# Patient Record
Sex: MALE | Race: WHITE | Employment: STUDENT | URBAN - METROPOLITAN AREA
[De-identification: names, ages, dates, MRNs, and addresses within clinical notes are randomized per-mention and may not be internally consistent; named-entity substitution may affect disease eponyms.]

---
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
## 2024-01-30 DIAGNOSIS — S92.002B OPEN DISPLACED FRACTURE OF LEFT CALCANEUS, UNSPECIFIED PORTION OF CALCANEUS, INITIAL ENCOUNTER: Primary | ICD-10-CM

## 2024-02-01 ENCOUNTER — OFFICE VISIT (OUTPATIENT)
Dept: ORTHOPEDIC SURGERY | Age: 19
End: 2024-02-01
Payer: COMMERCIAL

## 2024-02-01 ENCOUNTER — HOSPITAL ENCOUNTER (OUTPATIENT)
Dept: GENERAL RADIOLOGY | Age: 19
Discharge: HOME | End: 2024-02-03
Payer: COMMERCIAL

## 2024-02-01 DIAGNOSIS — S92.002B OPEN DISPLACED FRACTURE OF LEFT CALCANEUS, UNSPECIFIED PORTION OF CALCANEUS, INITIAL ENCOUNTER: ICD-10-CM

## 2024-02-01 DIAGNOSIS — S91.302A OPEN LATERAL DISLOCATION OF LEFT SUBTALAR JOINT, INITIAL ENCOUNTER: Primary | ICD-10-CM

## 2024-02-01 DIAGNOSIS — S93.315A OPEN LATERAL DISLOCATION OF LEFT SUBTALAR JOINT, INITIAL ENCOUNTER: Primary | ICD-10-CM

## 2024-02-01 PROCEDURE — 99213 OFFICE O/P EST LOW 20 MIN: CPT | Performed by: ORTHOPAEDIC SURGERY

## 2024-02-01 PROCEDURE — G8484 FLU IMMUNIZE NO ADMIN: HCPCS | Performed by: ORTHOPAEDIC SURGERY

## 2024-02-01 PROCEDURE — 73610 X-RAY EXAM OF ANKLE: CPT

## 2024-02-01 PROCEDURE — G8427 DOCREV CUR MEDS BY ELIG CLIN: HCPCS | Performed by: ORTHOPAEDIC SURGERY

## 2024-02-01 PROCEDURE — 99212 OFFICE O/P EST SF 10 MIN: CPT | Performed by: ORTHOPAEDIC SURGERY

## 2024-02-01 PROCEDURE — G8419 CALC BMI OUT NRM PARAM NOF/U: HCPCS | Performed by: ORTHOPAEDIC SURGERY

## 2024-02-01 PROCEDURE — 1036F TOBACCO NON-USER: CPT | Performed by: ORTHOPAEDIC SURGERY

## 2024-02-01 RX ORDER — OMEPRAZOLE 20 MG/1
20 CAPSULE, DELAYED RELEASE ORAL DAILY
COMMUNITY

## 2024-02-06 NOTE — PROGRESS NOTES
Chief Complaint   Patient presents with    Follow-up     6 month f/u ex fix removal. DOS 6/12/23.  Ambulating well without assist. Denies pain        OP:SURGEON: Dr. Alejandro Hughes,   DATE OF PROCEDURE: 6-12-23  PROCEDURE: REMOVAL OF EXTERNAL FIXATOR FROM LEFT LOWER EXTREMITY     Subjective:  Devaughn Dawson is following up from the above surgery. He is WBAT on left lower extremity. He ambulates with no assistive device.  Pain to extremity is none and is not taking prescribed pain medication. They denies numbness or tingling to the left lower extremity. Denies calf pain, chest pain, or shortness of breath.  Patient has finished DVT prophylaxis. Patient is not participating in therapy at this time.  Presents today with his father.  He is doing well after surgery.  States that he does have a mild limp which is improved and intermittent.  He has been working on home exercise program with [employer] and would like to get set up for additional physical therapy as he felt this did significantly help him prior.     Review of Systems -  All pertinent positives/negative in HPI     Objective:    General: Alert and oriented X 3, normocephalic atraumatic, external ears and eye normal, sclera clear, no acute distress, respirations easy and unlabored with no audible wheezes, skin warm and dry, speech and dress appropriate for noted age, affect euthymic.    Extremity:  Left Lower Extremity  Skin clean dry and intact, without signs of infection   Incision well-healed, no edema noted  Compartments supple throughout thigh and leg  Calf supple and not tender, negative Homans  Demonstrates active motion with ankle DF/PF/EHL  Ankle ROM 30/40 DF/PF, 5/10 subtalar ER/IR  Ambulates with no assistive devices.  States sensation intact to touch in sural, deep peroneal, superficial peroneal, saphenous, posterior tibial  nerve distributions to foot/ankle.  Palpable dorsalis pedis and posterior tibialis pulses, cap refill brisk in toes, foot

## 2024-02-08 ENCOUNTER — EVALUATION (OUTPATIENT)
Dept: PHYSICAL THERAPY | Age: 19
End: 2024-02-08

## 2024-02-08 DIAGNOSIS — S93.315A OPEN LATERAL DISLOCATION OF LEFT SUBTALAR JOINT, INITIAL ENCOUNTER: Primary | ICD-10-CM

## 2024-02-08 DIAGNOSIS — S91.302A OPEN LATERAL DISLOCATION OF LEFT SUBTALAR JOINT, INITIAL ENCOUNTER: Primary | ICD-10-CM

## 2024-02-08 NOTE — PROGRESS NOTES
Physical Therapy Daily Treatment Note    Date: 2024  Patient Name: Devaughn Dawson  : 2005   MRN: [id number]  DOInjury: 2023  DOSx: 2023; ex fix removal 2023  Referring Provider: Alejandro Hughes, DO  [address] [place] [address]     Medical Diagnosis:      Diagnosis Orders   1. Open lateral dislocation of left subtalar joint, initial encounter          Procedure(s):  LEFT FOOT IRRIGAATION AND DEBRIDEMENT WITH EXTERNAL FIXATOR APPLICATION     Surgeon(s):  Carlos A Hanley MD    Devaughn has a tight ankle and L LE weakness. Treatment started today with stretching and emphasis to stretch 4 times per day if he wants his ankle to get any looser (given the length of time since injury + immobilization). We will see him in clinic for single leg loading to build strength and to also drive ankle dorsiflexion.          Access Code: [email or web address]  URL: [email or web address]  Date: 2024  Prepared by: Delmar Martinez    Exercises  - Gastroc Stretch on Wall  - 4 x daily - 3 reps - 30 sec hold  - Standing Soleus Stretch (Mirrored)  - 4 x daily - 3 reps - 30 sec hold    X = TO BE PERFORMED NEXT VISIT  > = PROGRESS TO THIS    S: See eval  O:   Time 2449-2714     Visit - Repeat outcome measure at mid point and end.    Pain Pain 0-8/10     ROM Decreased DF and INV     Modalities         MO   Manual            Stretch      Wall gastroc stretch  3 x 30 sec Cue: toes straight ahead    Wall soleus stretch  3 x 30 sec Cue: toes straight ahead TE      TE   Exercise         TE   Leg Press 1-leg     (drive ankle DF)   X  TE   Kiswahili split squat     (increase ankle DF)   X     Single leg dead lift X     Single leg calf raise - standing    (increase ankle DF)   X     Single leg calf raise - sitting    (increase ankle DF)   X     Hamstring Curl Machine Can add as adjunct   TE   Knee Extension Machine Can add as adjunct   TE      TA      TA               A:  Tolerated well.  Discussed
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
## 2024-02-14 ENCOUNTER — TREATMENT (OUTPATIENT)
Dept: PHYSICAL THERAPY | Age: 19
End: 2024-02-14
Payer: COMMERCIAL

## 2024-02-14 DIAGNOSIS — S91.302A OPEN LATERAL DISLOCATION OF LEFT SUBTALAR JOINT, INITIAL ENCOUNTER: Primary | ICD-10-CM

## 2024-02-14 DIAGNOSIS — S93.315A OPEN LATERAL DISLOCATION OF LEFT SUBTALAR JOINT, INITIAL ENCOUNTER: Primary | ICD-10-CM

## 2024-02-14 PROCEDURE — 97112 NEUROMUSCULAR REEDUCATION: CPT

## 2024-02-14 PROCEDURE — 97110 THERAPEUTIC EXERCISES: CPT

## 2024-02-14 NOTE — PROGRESS NOTES
Physical Therapy Daily Treatment Note    Date: 2024  Patient Name: Devaughn Dawson  : 2005   MRN: [id number]  DOInjury: 2023  DOSx: 2023; ex fix removal 2023    Referring Provider:   Alejandro Hughes, DO  [address] [place] [address]         Medical Diagnosis:    Diagnosis Orders   1. Open lateral dislocation of left subtalar joint, initial encounter            Procedure(s):  LEFT FOOT IRRIGAATION AND DEBRIDEMENT WITH EXTERNAL FIXATOR APPLICATION     Surgeon(s):  Carlos A Hanley MD    Devaughn has a tight ankle and L LE weakness. Treatment started today with stretching and emphasis to stretch 4 times per day if he wants his ankle to get any looser (given the length of time since injury + immobilization). We will see him in clinic for single leg loading to build strength and to also drive ankle dorsiflexion.        Access Code: [email or web address]  URL: [email or web address]  Date: 2024  Prepared by: Delmar Martinez    Exercises  - Gastroc Stretch on Wall  - 4 x daily - 3 reps - 30 sec hold  - Standing Soleus Stretch (Mirrored)  - 4 x daily - 3 reps - 30 sec hold    X = TO BE PERFORMED NEXT VISIT  > = PROGRESS TO THIS    S: Patient reports ever since doing the stretches, he has been having no pain and feeling better.  O:   Time 6453-1707     Visit - Repeat outcome measure at mid point and end.    Pain Pain 0-8/10     ROM Decreased DF and INV     Modalities         MO   Manual            Stretch      Wall gastroc stretch  3 x 30 sec Cue: toes straight ahead    Wall soleus stretch  3 x 30 sec Cue: toes straight ahead TE      TE   Exercise         TE   Leg Press 1-leg     (drive ankle DF)   60# 1 x 15  40# 2 x 15  TE   Turkish split squat     (increase ankle DF)    x 10 each Difficult on RLE TE   Single leg dead lift X  TE   Single leg calf raise - standing    (increase ankle DF)   Eccentric   3 x 10   TE   Single leg calf raise - sitting    (increase ankle DF)

## 2024-02-19 ENCOUNTER — TREATMENT (OUTPATIENT)
Dept: PHYSICAL THERAPY | Age: 19
End: 2024-02-19
Payer: COMMERCIAL

## 2024-02-19 DIAGNOSIS — S93.315A OPEN LATERAL DISLOCATION OF LEFT SUBTALAR JOINT, INITIAL ENCOUNTER: Primary | ICD-10-CM

## 2024-02-19 DIAGNOSIS — S91.302A OPEN LATERAL DISLOCATION OF LEFT SUBTALAR JOINT, INITIAL ENCOUNTER: Primary | ICD-10-CM

## 2024-02-19 PROCEDURE — 97110 THERAPEUTIC EXERCISES: CPT

## 2024-02-19 NOTE — PROGRESS NOTES
Physical Therapy Daily Treatment Note    Date: 2024  Patient Name: Devaughn Dawson  : 2005   MRN: [id number]  DOInjury: 2023  DOSx: 2023; ex fix removal 2023    Referring Provider:   Alejandro Hughes, DO  [address] [place] [address]         Medical Diagnosis:    Diagnosis Orders   1. Open lateral dislocation of left subtalar joint, initial encounter              Procedure(s):  LEFT FOOT IRRIGAATION AND DEBRIDEMENT WITH EXTERNAL FIXATOR APPLICATION     Surgeon(s):  Carlos A Hanley MD    Devaughn has a tight ankle and L LE weakness. Treatment started today with stretching and emphasis to stretch 4 times per day if he wants his ankle to get any looser (given the length of time since injury + immobilization). We will see him in clinic for single leg loading to build strength and to also drive ankle dorsiflexion.        Access Code: [email or web address]  URL: [email or web address]  Date: 2024  Prepared by: Delmar Martinez    Exercises  - Gastroc Stretch on Wall  - 4 x daily - 3 reps - 30 sec hold  - Standing Soleus Stretch (Mirrored)  - 4 x daily - 3 reps - 30 sec hold    X = TO BE PERFORMED NEXT VISIT  > = PROGRESS TO THIS    S: Patient states was a little sore following last session, for about a couple days; then progressively improved  O:   Time 7137-4326     Visit 3/8-12 Repeat outcome measure at mid point and end.    Pain 6/10 pain in AM   3/10 at PT     ROM Decreased DF and INV     Modalities         MO   Manual            Stretch      Wall gastroc stretch  3 x 30 sec Cue: toes straight ahead    Wall soleus stretch  3 x 30 sec Cue: toes straight ahead TE      TE   Exercise         TE   Leg Press 1-leg     (drive ankle DF)   50# 3 x 15  TE   Yakut split squat     (increase ankle DF)   3 x 10 L LE on ground  TE   Single leg dead lift X  TE   Single leg calf raise - standing    (increase ankle DF)   Eccentric   3 x 10  Cues to slowly lower TE   Single leg calf

## 2024-02-21 ENCOUNTER — TREATMENT (OUTPATIENT)
Dept: PHYSICAL THERAPY | Age: 19
End: 2024-02-21

## 2024-02-21 DIAGNOSIS — S91.302A OPEN LATERAL DISLOCATION OF LEFT SUBTALAR JOINT, INITIAL ENCOUNTER: Primary | ICD-10-CM

## 2024-02-21 DIAGNOSIS — S93.315A OPEN LATERAL DISLOCATION OF LEFT SUBTALAR JOINT, INITIAL ENCOUNTER: Primary | ICD-10-CM

## 2024-02-21 NOTE — PROGRESS NOTES
Physical Therapy Daily Treatment Note    Date: 2024  Patient Name: Devaughn Dawson  : 2005   MRN: [id number]  DOInjury: 2023  DOSx: 2023; ex fix removal 2023      Referring Provider:   Alejandro Hughes, DO  [address] [place] [address]      Medical Diagnosis:    Diagnosis Orders   1. Open lateral dislocation of left subtalar joint, initial encounter            Procedure(s):  LEFT FOOT IRRIGAATION AND DEBRIDEMENT WITH EXTERNAL FIXATOR APPLICATION     Surgeon(s):  Carlos A Hanley MD    Devaughn has a tight ankle and L LE weakness. Treatment started today with stretching and emphasis to stretch 4 times per day if he wants his ankle to get any looser (given the length of time since injury + immobilization). We will see him in clinic for single leg loading to build strength and to also drive ankle dorsiflexion.        Access Code: [email or web address]  URL: [email or web address]  Date: 2024  Prepared by: Delmar Martinez    Exercises  - Gastroc Stretch on Wall  - 4 x daily - 3 reps - 30 sec hold  - Standing Soleus Stretch (Mirrored)  - 4 x daily - 3 reps - 30 sec hold    X = TO BE PERFORMED NEXT VISIT  > = PROGRESS TO THIS    S: Patient states he has had some long hours at work the past week but hasn't been too bad. Mainly gets stiff and sore when sitting for too long.   O:   Time [id number]     Visit 3/8- Repeat outcome measure at mid point and end.    Pain 6/10 pain in AM   3/10 at PT     ROM Decreased DF and INV     Modalities         MO   Manual            Stretch      Wall gastroc stretch  3 x 30 sec Cue: toes straight ahead    Wall soleus stretch  3 x 30 sec Cue: toes straight ahead TE      TE   Exercise         TE   Leg Press 1-leg     (drive ankle DF)   60# 3 x 15  TE   Congolese split squat     (increase ankle DF)   3 x 10 L LE on ground  TE   Single leg dead lift 10# 2 x 10  TE   Single leg calf raise - standing    (increase ankle DF)   Eccentric   3 x 10  Cues

## 2024-02-26 ENCOUNTER — TREATMENT (OUTPATIENT)
Dept: PHYSICAL THERAPY | Age: 19
End: 2024-02-26
Payer: COMMERCIAL

## 2024-02-26 DIAGNOSIS — S91.302A OPEN LATERAL DISLOCATION OF LEFT SUBTALAR JOINT, INITIAL ENCOUNTER: Primary | ICD-10-CM

## 2024-02-26 DIAGNOSIS — S93.315A OPEN LATERAL DISLOCATION OF LEFT SUBTALAR JOINT, INITIAL ENCOUNTER: Primary | ICD-10-CM

## 2024-02-26 PROCEDURE — 97110 THERAPEUTIC EXERCISES: CPT

## 2024-02-26 NOTE — PROGRESS NOTES
Physical Therapy Daily Treatment Note    Date: 2024  Patient Name: Devaughn Dawson  : 2005   MRN: [id number]  DOInjury: 2023  DOSx: 2023; ex fix removal 2023      Referring Provider:   Alejandro Hughes, DO  [address] [place] [address]      Medical Diagnosis:    Diagnosis Orders   1. Open lateral dislocation of left subtalar joint, initial encounter            Procedure(s):  LEFT FOOT IRRIGAATION AND DEBRIDEMENT WITH EXTERNAL FIXATOR APPLICATION     Surgeon(s):  Carlos A Hanley MD    Devaughn has a tight ankle and L LE weakness. Treatment started today with stretching and emphasis to stretch 4 times per day if he wants his ankle to get any looser (given the length of time since injury + immobilization). We will see him in clinic for single leg loading to build strength and to also drive ankle dorsiflexion.        Access Code: [email or web address]  URL: [email or web address]  Date: 2024  Prepared by: Delmar Martinez    Exercises  - Gastroc Stretch on Wall  - 4 x daily - 3 reps - 30 sec hold  - Standing Soleus Stretch (Mirrored)  - 4 x daily - 3 reps - 30 sec hold    X = TO BE PERFORMED NEXT VISIT  > = PROGRESS TO THIS    S: Patient reports feeling good today after work.  O:   Time 7877-9817     Visit -12 Repeat outcome measure at mid point and end.    Pain 5/10 pain in AM   3/10 at PT     ROM Decreased DF and INV     Modalities         MO   Manual            Stretch      Wall gastroc stretch  3 x 30 sec Cue: toes straight ahead    Wall soleus stretch  3 x 30 sec Cue: toes straight ahead TE      TE   Exercise         TE   Leg Press 1-leg     (drive ankle DF)   60# 3 x 15  TE   Turkish split squat     (increase ankle DF)   3 x 10 L LE on ground  TE   Single leg dead lift 10# 2 x 10  TE   Single leg calf raise - standing    (increase ankle DF)   Eccentric   3 x 10  Cues to slowly lower TE   Single leg calf raise - sitting    (increase ankle DF)   X  TE   Hamstring

## 2024-02-28 ENCOUNTER — TREATMENT (OUTPATIENT)
Dept: PHYSICAL THERAPY | Age: 19
End: 2024-02-28
Payer: COMMERCIAL

## 2024-02-28 DIAGNOSIS — S91.302A OPEN LATERAL DISLOCATION OF LEFT SUBTALAR JOINT, INITIAL ENCOUNTER: Primary | ICD-10-CM

## 2024-02-28 DIAGNOSIS — S93.315A OPEN LATERAL DISLOCATION OF LEFT SUBTALAR JOINT, INITIAL ENCOUNTER: Primary | ICD-10-CM

## 2024-02-28 PROCEDURE — 97110 THERAPEUTIC EXERCISES: CPT

## 2024-02-28 NOTE — PROGRESS NOTES
Physical Therapy Daily Treatment Note    Date: 2024  Patient Name: Devaughn Dawson  : 2005   MRN: [id number]  DOInjury: 2023  DOSx: 2023; ex fix removal 2023      Referring Provider:   Alejandro Hughes, DO  [address] [place] [address]      Medical Diagnosis:    Diagnosis Orders   1. Open lateral dislocation of left subtalar joint, initial encounter              Procedure(s):  LEFT FOOT IRRIGAATION AND DEBRIDEMENT WITH EXTERNAL FIXATOR APPLICATION     Surgeon(s):  Carlos A Hanley MD    Devaughn has a tight ankle and L LE weakness. Treatment started today with stretching and emphasis to stretch 4 times per day if he wants his ankle to get any looser (given the length of time since injury + immobilization). We will see him in clinic for single leg loading to build strength and to also drive ankle dorsiflexion.        Access Code: [email or web address]  URL: [email or web address]  Date: 2024  Prepared by: Delmar Martinez    Exercises  - Gastroc Stretch on Wall  - 4 x daily - 3 reps - 30 sec hold  - Standing Soleus Stretch (Mirrored)  - 4 x daily - 3 reps - 30 sec hold    X = TO BE PERFORMED NEXT VISIT  > = PROGRESS TO THIS    S: Patient reports he is 60-70% better since injury. States ankle is still tight and weak.  O:   Time 7135-4911     Visit - Repeat outcome measure at mid point and end.    Pain 5/10 pain in AM   3/10 at PT     ROM Decreased DF and INV     Modalities         MO   Manual            Stretch      Wall gastroc stretch  3 x 30 sec Cue: toes straight ahead    Wall soleus stretch  3 x 30 sec Cue: toes straight ahead TE      TE   Exercise         TE   Leg Press 1-leg     (drive ankle DF)   60# 3 x 15  TE   Hebrew split squat     (increase ankle DF)   3 x 10 L LE on ground  TE   Single leg dead lift 10# 2 x 10  TE   Single leg calf raise - standing    (increase ankle DF)   3 x 10  Cues to slowly lower TE   Single leg calf raise - sitting    (increase

## 2024-03-04 ENCOUNTER — TREATMENT (OUTPATIENT)
Dept: PHYSICAL THERAPY | Age: 19
End: 2024-03-04
Payer: COMMERCIAL

## 2024-03-04 DIAGNOSIS — S91.302A OPEN LATERAL DISLOCATION OF LEFT SUBTALAR JOINT, INITIAL ENCOUNTER: Primary | ICD-10-CM

## 2024-03-04 DIAGNOSIS — S93.315A OPEN LATERAL DISLOCATION OF LEFT SUBTALAR JOINT, INITIAL ENCOUNTER: Primary | ICD-10-CM

## 2024-03-04 PROCEDURE — 97110 THERAPEUTIC EXERCISES: CPT

## 2024-03-04 NOTE — PROGRESS NOTES
Physical Therapy Daily Treatment Note    Date: 3/4/2024  Patient Name: Devaughn Dawson  : 2005   MRN: [id number]  DOInjury: 2023  DOSx: 2023; ex fix removal 2023      Referring Provider:   Alejandro Hughes, DO  [address] [place] [address]      Medical Diagnosis:    Diagnosis Orders   1. Open lateral dislocation of left subtalar joint, initial encounter                Procedure(s):  LEFT FOOT IRRIGAATION AND DEBRIDEMENT WITH EXTERNAL FIXATOR APPLICATION     Surgeon(s):  Carlos A Hanley MD    Devaughn has a tight ankle and L LE weakness. Treatment started today with stretching and emphasis to stretch 4 times per day if he wants his ankle to get any looser (given the length of time since injury + immobilization). We will see him in clinic for single leg loading to build strength and to also drive ankle dorsiflexion.        Access Code: [email or web address]  URL: [email or web address]  Date: 2024  Prepared by: Delmar Martinez    Exercises  - Gastroc Stretch on Wall  - 4 x daily - 3 reps - 30 sec hold  - Standing Soleus Stretch (Mirrored)  - 4 x daily - 3 reps - 30 sec hold    X = TO BE PERFORMED NEXT VISIT  > = PROGRESS TO THIS    S: Patient reports he continues to feel better, not too much pain. Works about 70 hours a week.   O:   Time 4309-3523     Visit -12 Repeat outcome measure at mid point and end.    Pain 5/10 pain in AM   3/10 at PT     ROM Decreased DF and INV     Modalities         MO   Manual            Stretch      Wall gastroc stretch  3 x 30 sec Cue: toes straight ahead    Wall soleus stretch  3 x 30 sec Cue: toes straight ahead TE      TE   Exercise         TE   Leg Press 1-leg     (drive ankle DF)   60# 3 x 15  TE   Nepali split squat     (increase ankle DF)   3 x 10 L LE on ground  TE   Single leg dead lift 10# 2 x 10  TE   Single leg calf raise - standing    (increase ankle DF)   3 x 10  Cues to slowly lower TE   Single leg calf raise -

## 2024-03-06 ENCOUNTER — TREATMENT (OUTPATIENT)
Dept: PHYSICAL THERAPY | Age: 19
End: 2024-03-06

## 2024-03-06 DIAGNOSIS — S91.302A OPEN LATERAL DISLOCATION OF LEFT SUBTALAR JOINT, INITIAL ENCOUNTER: Primary | ICD-10-CM

## 2024-03-06 DIAGNOSIS — S93.315A OPEN LATERAL DISLOCATION OF LEFT SUBTALAR JOINT, INITIAL ENCOUNTER: Primary | ICD-10-CM

## 2024-03-06 NOTE — PROGRESS NOTES
Physical Therapy Daily Treatment Note    Date: 3/6/2024  Patient Name: Devaughn Dawson  : 2005   MRN: [id number]  DOInjury: 2023  DOSx: 2023; ex fix removal 2023      Referring Provider:   Alejandro Hughes, DO  [address] [place] [address]      Medical Diagnosis:     LEFT FOOT IRRIGAATION AND DEBRIDEMENT WITH EXTERNAL FIXATOR APPLICATION       Procedure(s):  LEFT FOOT IRRIGAATION AND DEBRIDEMENT WITH EXTERNAL FIXATOR APPLICATION     Surgeon(s):  Carlos A Hanley MD    Devaughn has a tight ankle and L LE weakness. Treatment started today with stretching and emphasis to stretch 4 times per day if he wants his ankle to get any looser (given the length of time since injury + immobilization). We will see him in clinic for single leg loading to build strength and to also drive ankle dorsiflexion.        Access Code: [email or web address]  URL: [email or web address]  Date: 2024  Prepared by: Delmar Martinez    Exercises  - Gastroc Stretch on Wall  - 4 x daily - 3 reps - 30 sec hold  - Standing Soleus Stretch (Mirrored)  - 4 x daily - 3 reps - 30 sec hold    X = TO BE PERFORMED NEXT VISIT  > = PROGRESS TO THIS    S: Patient reports he continues to feel better, not too much pain. Works about 70 hours a week.   O:   Time 5177-6047     Visit - Repeat outcome measure at mid point and end.    Pain 5/10 pain in AM   3/10 at PT     ROM Decreased DF and INV     Modalities         MO   Manual            Stretch      Wall gastroc stretch  3 x 30 sec Cue: toes straight ahead    Wall soleus stretch  3 x 30 sec Cue: toes straight ahead TE      TE   Exercise         TE   Leg Press 1-leg     (drive ankle DF)   80# 3 x 15  TE   Malay split squat     (increase ankle DF)   3 x 10 L LE on ground  TE   Single leg dead lift 10# 2 x 10  TE   Single leg calf raise - standing    (increase ankle DF)   3 x 10  Cues to slowly lower TE   Single leg calf raise - sitting    (increase ankle DF)   Next

## 2024-03-11 ENCOUNTER — TREATMENT (OUTPATIENT)
Dept: PHYSICAL THERAPY | Age: 19
End: 2024-03-11
Payer: COMMERCIAL

## 2024-03-11 DIAGNOSIS — S91.302A OPEN LATERAL DISLOCATION OF LEFT SUBTALAR JOINT, INITIAL ENCOUNTER: Primary | ICD-10-CM

## 2024-03-11 DIAGNOSIS — S93.315A OPEN LATERAL DISLOCATION OF LEFT SUBTALAR JOINT, INITIAL ENCOUNTER: Primary | ICD-10-CM

## 2024-03-11 PROCEDURE — 97112 NEUROMUSCULAR REEDUCATION: CPT

## 2024-03-11 PROCEDURE — 97110 THERAPEUTIC EXERCISES: CPT

## 2024-03-11 NOTE — PROGRESS NOTES
standing    (increase ankle DF)   3 x 10  Cues to slowly lower TE   Single leg calf raise - sitting    (increase ankle DF)   40# 2 x 10  60# 2 x 10   80# 2 x 10  TE   Hamstring Curl Machine >  TE   Knee Extension Machine - single leg  30# 3 x 10   TE      TA      TA               A: Tolerated well. Pt able tolerate all ex's with short rest periods as needed.  Discussed anatomy, physiology, body mechanics, principles of loading, and progressive loading/activity.   P: Continue with rehab plan. Pt needs 4:30 pm appt times coming from work.   Steph Velasquez, PTA    Treatment Charges: Mins Units   Initial Evaluation     Re-Evaluation     Ther Exercise         TE 30 2   Manual Therapy     MT     Ther Activities        TA     Gait Training          GT     Neuro Re-education NR 15 1   Modalities     Non-Billable Service Time     Other     Total Time/Units 40 3

## 2024-03-13 ENCOUNTER — TREATMENT (OUTPATIENT)
Dept: PHYSICAL THERAPY | Age: 19
End: 2024-03-13

## 2024-03-13 DIAGNOSIS — S91.302A OPEN LATERAL DISLOCATION OF LEFT SUBTALAR JOINT, INITIAL ENCOUNTER: Primary | ICD-10-CM

## 2024-03-13 DIAGNOSIS — S93.315A OPEN LATERAL DISLOCATION OF LEFT SUBTALAR JOINT, INITIAL ENCOUNTER: Primary | ICD-10-CM

## 2024-03-13 NOTE — PROGRESS NOTES
Physical Therapy Daily Treatment Note    Date: 3/13/2024  Patient Name: Devaughn Dawson  : 2005   MRN: [id number]  DOInjury: 2023  DOSx: 2023; ex fix removal 2023      Referring Provider:   Alejandro Hughes, DO  [address] [place] [address]      Medical Diagnosis:   LEFT FOOT IRRIGAATION AND DEBRIDEMENT WITH EXTERNAL FIXATOR APPLICATION       Procedure(s):  LEFT FOOT IRRIGAATION AND DEBRIDEMENT WITH EXTERNAL FIXATOR APPLICATION     Surgeon(s):  Carlos A Hanley MD    Devaughn has a tight ankle and L LE weakness. Treatment started today with stretching and emphasis to stretch 4 times per day if he wants his ankle to get any looser (given the length of time since injury + immobilization). We will see him in clinic for single leg loading to build strength and to also drive ankle dorsiflexion.        Access Code: [email or web address]  URL: [email or web address]  Date: 2024  Prepared by: Delmar Martinez    Exercises  - Gastroc Stretch on Wall  - 4 x daily - 3 reps - 30 sec hold  - Standing Soleus Stretch (Mirrored)  - 4 x daily - 3 reps - 30 sec hold    X = TO BE PERFORMED NEXT VISIT  > = PROGRESS TO THIS    S: Patient reports not any better or worse. Bottom of feet hurt but that's normal when he gets off work. Works about 70 hours a week. Patient reports 50% better since start of PT. Tightness and weakness still present.   O:   Time 5376-8219     Visit 10/8- Repeat outcome measure at mid point and end.    Pain 5/10 pain in AM   3/10 at PT     ROM Decreased DF and INV     Modalities         MO   Manual            Stretch      Wall gastroc stretch  3 x 30 sec Cue: toes straight ahead    Wall soleus stretch  3 x 30 sec Cue: toes straight ahead TE      TE   Exercise         TE   Leg Press 1-leg     (drive ankle DF)   100# 3 x 15  TE   Tamazight split squat     (increase ankle DF)   10# 3 x 10 L LE on ground  TE   Single leg dead lift 10# 3 x 10  TE   Single leg calf raise -

## 2024-03-20 ENCOUNTER — TREATMENT (OUTPATIENT)
Dept: PHYSICAL THERAPY | Age: 19
End: 2024-03-20

## 2024-03-20 DIAGNOSIS — S93.315A OPEN LATERAL DISLOCATION OF LEFT SUBTALAR JOINT, INITIAL ENCOUNTER: Primary | ICD-10-CM

## 2024-03-20 DIAGNOSIS — S91.302A OPEN LATERAL DISLOCATION OF LEFT SUBTALAR JOINT, INITIAL ENCOUNTER: Primary | ICD-10-CM

## 2024-03-20 NOTE — PROGRESS NOTES
Physical Therapy Daily Treatment Note    Date: 3/20/2024  Patient Name: Devaughn Dawson  : 2005   MRN: [id number]  DOInjury: 2023  DOSx: 2023; ex fix removal 2023      Referring Provider:   Alejandro Hughes, DO  [address] [place] [address]      Medical Diagnosis:   LEFT FOOT IRRIGAATION AND DEBRIDEMENT WITH EXTERNAL FIXATOR APPLICATION       Procedure(s):  LEFT FOOT IRRIGAATION AND DEBRIDEMENT WITH EXTERNAL FIXATOR APPLICATION     Surgeon(s):  Carlos A Hanley MD    Devaughn has a tight ankle and L LE weakness. Treatment started today with stretching and emphasis to stretch 4 times per day if he wants his ankle to get any looser (given the length of time since injury + immobilization). We will see him in clinic for single leg loading to build strength and to also drive ankle dorsiflexion.        Access Code: [email or web address]  URL: [email or web address]  Date: 2024  Prepared by: Delmar Martinez    Exercises  - Gastroc Stretch on Wall  - 4 x daily - 3 reps - 30 sec hold  - Standing Soleus Stretch (Mirrored)  - 4 x daily - 3 reps - 30 sec hold    X = TO BE PERFORMED NEXT VISIT  > = PROGRESS TO THIS    S: Patient reports tired more than anything from not getting enough sleep since weekend.   O:   Time 2929-1859     Visit - Repeat outcome measure at mid point and end.    Pain 3/10 at PT     ROM Decreased DF and INV     Modalities         MO   Manual            Stretch      Wall gastroc stretch  3 x 30 sec Cue: toes straight ahead    Wall soleus stretch  3 x 30 sec Cue: toes straight ahead TE      TE   Exercise         TE   Leg Press 1-leg     (drive ankle DF)   100# 3 x 15  TE   Honduran split squat     (increase ankle DF)   10# 3 x 10 L LE on ground  TE   Single leg dead lift 10# 3 x 10  TE   Single leg calf raise - standing    (increase ankle DF)   Cues to slowly lower TE   Single leg calf raise - sitting    (increase ankle DF)   120# 3 x 25  TE   Hamstring Curl

## 2024-03-25 ENCOUNTER — TREATMENT (OUTPATIENT)
Dept: PHYSICAL THERAPY | Age: 19
End: 2024-03-25
Payer: COMMERCIAL

## 2024-03-25 DIAGNOSIS — S91.302A OPEN LATERAL DISLOCATION OF LEFT SUBTALAR JOINT, INITIAL ENCOUNTER: Primary | ICD-10-CM

## 2024-03-25 DIAGNOSIS — S93.315A OPEN LATERAL DISLOCATION OF LEFT SUBTALAR JOINT, INITIAL ENCOUNTER: Primary | ICD-10-CM

## 2024-03-25 PROCEDURE — 97112 NEUROMUSCULAR REEDUCATION: CPT

## 2024-03-25 PROCEDURE — 97110 THERAPEUTIC EXERCISES: CPT

## 2024-03-25 NOTE — PROGRESS NOTES
Machine   TE   Knee Extension Machine - single leg  30# 3 x 10   TE      TA      TA               A: Tolerated well. Pt able tolerate all ex's with short rest periods as needed. Discussed anatomy, physiology, body mechanics, principles of loading, and progressive loading/activity.   P: Continue with rehab plan. Pt needs 4:30 pm appt times coming from work. Next session scheduled as last.  Steph Velasquez, CINDY    Treatment Charges: Mins Units   Initial Evaluation     Re-Evaluation     Ther Exercise         TE 30 2   Manual Therapy     MT     Ther Activities        TA     Gait Training          GT     Neuro Re-education NR 10 1   Modalities     Non-Billable Service Time 10 0   Other     Total Time/Units 50 3

## 2024-03-27 ENCOUNTER — TREATMENT (OUTPATIENT)
Dept: PHYSICAL THERAPY | Age: 19
End: 2024-03-27
Payer: COMMERCIAL

## 2024-03-27 DIAGNOSIS — S93.315A OPEN LATERAL DISLOCATION OF LEFT SUBTALAR JOINT, INITIAL ENCOUNTER: Primary | ICD-10-CM

## 2024-03-27 DIAGNOSIS — S91.302A OPEN LATERAL DISLOCATION OF LEFT SUBTALAR JOINT, INITIAL ENCOUNTER: Primary | ICD-10-CM

## 2024-03-27 PROCEDURE — 97112 NEUROMUSCULAR REEDUCATION: CPT

## 2024-03-27 PROCEDURE — 97110 THERAPEUTIC EXERCISES: CPT

## 2024-03-27 NOTE — PROGRESS NOTES
Hamstring Curl Machine   TE   Knee Extension Machine - single leg  30# 3 x 10   TE      TA      TA               A: Tolerated well. Pt able tolerate all ex's with short rest periods as needed. Discussed anatomy, physiology, body mechanics, principles of loading, and progressive loading/activity.   P: Today was patient's last session and they are to continue with HEP.  Steph Velasquez, PTA    Treatment Charges: Mins Units   Initial Evaluation     Re-Evaluation     Ther Exercise         TE 30 2   Manual Therapy     MT     Ther Activities        TA     Gait Training          GT     Neuro Re-education NR 10 1   Modalities     Non-Billable Service Time 10 0   Other     Total Time/Units 50 3

## 2024-04-04 ENCOUNTER — TELEPHONE (OUTPATIENT)
Dept: ORTHOPEDIC SURGERY | Age: 19
End: 2024-04-04

## 2024-04-04 NOTE — TELEPHONE ENCOUNTER
Spoke to patient and scheduled appointment    Future Appointments   Date Time Provider Department Center   4/18/2024 10:15 AM Alejandro Hughes DO [facility]

## 2024-04-04 NOTE — TELEPHONE ENCOUNTER
Patients mother calling for Devaughn. Patients done with Physical Therapy and needs to be evaluated to resume more therapy. Also patient is still having some problems walking.   Please advise

## 2024-04-12 DIAGNOSIS — S91.302A OPEN LATERAL DISLOCATION OF LEFT SUBTALAR JOINT, INITIAL ENCOUNTER: Primary | ICD-10-CM

## 2024-04-12 DIAGNOSIS — S93.315A OPEN LATERAL DISLOCATION OF LEFT SUBTALAR JOINT, INITIAL ENCOUNTER: Primary | ICD-10-CM

## 2024-04-18 ENCOUNTER — OFFICE VISIT (OUTPATIENT)
Dept: ORTHOPEDIC SURGERY | Age: 19
End: 2024-04-18
Payer: COMMERCIAL

## 2024-04-18 ENCOUNTER — HOSPITAL ENCOUNTER (OUTPATIENT)
Dept: GENERAL RADIOLOGY | Age: 19
Discharge: HOME | End: 2024-04-20
Payer: COMMERCIAL

## 2024-04-18 DIAGNOSIS — S91.302A OPEN LATERAL DISLOCATION OF LEFT SUBTALAR JOINT, INITIAL ENCOUNTER: ICD-10-CM

## 2024-04-18 DIAGNOSIS — S93.315A OPEN LATERAL DISLOCATION OF LEFT SUBTALAR JOINT, INITIAL ENCOUNTER: Primary | ICD-10-CM

## 2024-04-18 DIAGNOSIS — S93.315A OPEN LATERAL DISLOCATION OF LEFT SUBTALAR JOINT, INITIAL ENCOUNTER: ICD-10-CM

## 2024-04-18 DIAGNOSIS — S91.302A OPEN LATERAL DISLOCATION OF LEFT SUBTALAR JOINT, INITIAL ENCOUNTER: Primary | ICD-10-CM

## 2024-04-18 DIAGNOSIS — S92.002B OPEN DISPLACED FRACTURE OF LEFT CALCANEUS, UNSPECIFIED PORTION OF CALCANEUS, INITIAL ENCOUNTER: ICD-10-CM

## 2024-04-18 PROCEDURE — 99212 OFFICE O/P EST SF 10 MIN: CPT

## 2024-04-18 PROCEDURE — 73610 X-RAY EXAM OF ANKLE: CPT

## 2024-04-18 PROCEDURE — G8419 CALC BMI OUT NRM PARAM NOF/U: HCPCS | Performed by: PHYSICIAN ASSISTANT

## 2024-04-18 PROCEDURE — 1036F TOBACCO NON-USER: CPT | Performed by: PHYSICIAN ASSISTANT

## 2024-04-18 PROCEDURE — G8427 DOCREV CUR MEDS BY ELIG CLIN: HCPCS | Performed by: PHYSICIAN ASSISTANT

## 2024-04-18 PROCEDURE — 99213 OFFICE O/P EST LOW 20 MIN: CPT | Performed by: PHYSICIAN ASSISTANT

## 2024-04-18 NOTE — PROGRESS NOTES
Chief Complaint   Patient presents with    Follow-up     Here for follow to discuss therapy and difficulty walking. Ambulating without assist. States pain is 3/10        OP:SURGEON: Dr. Alejandro Hughes DO  DATE OF PROCEDURE: 6-12-23  PROCEDURE: REMOVAL OF EXTERNAL FIXATOR FROM LEFT LOWER EXTREMITY     POD: 10+ months    Subjective:  Devaughn Dawson is following up from the above surgery. He is WBAT on left lower extremity. He ambulates with no assistive device.  Pain to extremity is mild and is not taking prescribed pain medication. They denies numbness or tingling to the left lower extremity. Denies calf pain, chest pain, or shortness of breath.  Patient has finished DVT prophylaxis. Patient is not participating in therapy at this time.  Patient is doing okay after surgery.  States that he does have a mild limp which is improved and intermittent.  Presents today with his father.  He recently completed PT at [facility] in [place] which he states he was making good progress and feels like he would benefit from some more therapy.     Review of Systems -  All pertinent positives/negative in HPI     Objective:    General: Alert and oriented X 3, normocephalic atraumatic, external ears and eye normal, sclera clear, no acute distress, respirations easy and unlabored with no audible wheezes, skin warm and dry, speech and dress appropriate for noted age, affect euthymic.    Extremity:  Left Lower Extremity  Skin clean dry and intact, without signs of infection   Incision well-healed  no edema noted  Compartments supple throughout thigh and leg  Calf supple and not tender  negative Homans  Demonstrates active motion with ankle DF/PF/EHL  Ankle ROM 30/40 DF/PF, 5/10 subtalar ER/IR  Ambulates with no assistive devices.  States sensation intact to touch in sural, deep peroneal, superficial peroneal, saphenous, posterior tibial  nerve distributions to foot/ankle.  Palpable dorsalis pedis and posterior tibialis pulses, cap refill

## 2024-04-18 NOTE — PATIENT INSTRUCTIONS
Weightbearing as tolerated left lower extremity.    PT extension at [facility] in [place]    Follow-up as needed.    Call if any questions or concerns.

## 2024-04-26 ENCOUNTER — EVALUATION (OUTPATIENT)
Dept: PHYSICAL THERAPY | Age: 19
End: 2024-04-26

## 2024-04-26 DIAGNOSIS — S91.302D OPEN LATERAL DISLOCATION OF LEFT SUBTALAR JOINT, SUBSEQUENT ENCOUNTER: Primary | ICD-10-CM

## 2024-04-26 DIAGNOSIS — S93.315D OPEN LATERAL DISLOCATION OF LEFT SUBTALAR JOINT, SUBSEQUENT ENCOUNTER: Primary | ICD-10-CM

## 2024-04-26 DIAGNOSIS — S92.002G OPEN DISPLACED FRACTURE OF LEFT CALCANEUS WITH DELAYED HEALING, UNSPECIFIED PORTION OF CALCANEUS, SUBSEQUENT ENCOUNTER: ICD-10-CM

## 2024-04-26 PROBLEM — S93.315A: Status: ACTIVE | Noted: 2024-04-26

## 2024-04-26 PROBLEM — S91.302A: Status: ACTIVE | Noted: 2024-04-26

## 2024-04-26 NOTE — PROGRESS NOTES
Physical Therapy Daily Treatment Note    Date: 2024  Patient Name: Devaughn Dawson  : 2005   MRN: [id number]  DOInjury: 2023  DOSx: 2023; ex fix removal 2023      Referring Provider:   Lupillo Sumner PA  [address] [place] [address]      Medical Diagnosis:   LEFT FOOT IRRIGAATION AND DEBRIDEMENT WITH EXTERNAL FIXATOR APPLICATION       Procedure(s):  LEFT FOOT IRRIGAATION AND DEBRIDEMENT WITH EXTERNAL FIXATOR APPLICATION     Surgeon(s):  Carlos A Hanley MD    Devaughn has a tight ankle and L LE weakness - both have improved, but need more work.  Treatment started today with stretching PF and DF.  Will add INV and EV next.  Will add heavy single leg strengthening ASAP.         Access Code: [email or web address]  URL: [email or web address]  Date: 2024  Prepared by: Delmar Martinez    Exercises  - Standing Ankle Dorsiflexion Stretch on Chair  - 2 x daily - 3 reps - 30 sec hold  - Standing Plantarflexion Stretch   - 2 x daily - 3 reps - 30 sec hold hold    X = TO BE PERFORMED NEXT VISIT  > = PROGRESS TO THIS    S: See eval  O:   Time 9291-4710     Visit -    30 total; 13 used prior     Repeat outcome measure at mid point and end.    Pain 0/10     ROM See eval     Modalities         MO   Manual            Stretch      Self ankle stretch leaning onto step or chair 3 x 30 sec     Standing PF stretch (L foot on chair)  3 x 30 sec     INV stretch with towel X     EV stretch with towel X  TE   Exercise         TE   Leg Press 1-leg     (drive ankle DF)   >  TE   Turkmen split squat     (increase ankle DF)   X  TE   Single leg dead lift >  TE   Single leg calf raise -    X Will need use of both hands to perform this single leg TE   Single leg calf raise - sitting    (increase ankle DF)   >  TE   Hamstring Curl Machine   TE   Knee Extension Machine - single leg  >  TE      TA      TA               A: Tolerated well. Feedback and cues necessary for developing neuromuscular 
 [x] Plans / Goals, Risks / Benefits discussed  [x] Home exercise program  Method of Education: [x] Verbal  [x] Demo  [x] Written  Comprehension of Education:  [x] Verbalizes understanding.  [x] Demonstrates understanding.  [] Needs Review.  [] Demonstrates / verbalizes understanding of HEP/Ed previously given.    Frequency: 2 days per week for 4-6 weeks    Patient understands diagnosis/prognosis and consents to treatment, plan and goals: [x] Yes    [] No     Thank you for the opportunity to work with your patient.  If you have questions or comments, please contact me at [phone]; fax: [phone].    Electronically signed by: Delmar Martinez PT         Please sign Physician's Certification and return to:  [facility]  [address] [place] [address]  Dept: [phone]  Dept Fax: [phone]  Loc: [phone]    Physician's Certification / Comments     Frequency/Duration 2 days per week for 4-6 weeks.   Certification period from 4/26/2024 to 7/26//2024.    I have reviewed the Plan of Care established for skilled therapy services and certify that the services are required and that they will be provided while the patient is under my care.    Physician's Comments/Revisions:               Physician's Printed Name:                                           Physician's Signature:                                                               Date:

## 2024-04-29 ENCOUNTER — TREATMENT (OUTPATIENT)
Dept: PHYSICAL THERAPY | Age: 19
End: 2024-04-29
Payer: COMMERCIAL

## 2024-04-29 DIAGNOSIS — S91.302D OPEN LATERAL DISLOCATION OF LEFT SUBTALAR JOINT, SUBSEQUENT ENCOUNTER: Primary | ICD-10-CM

## 2024-04-29 DIAGNOSIS — S93.315D OPEN LATERAL DISLOCATION OF LEFT SUBTALAR JOINT, SUBSEQUENT ENCOUNTER: Primary | ICD-10-CM

## 2024-04-29 DIAGNOSIS — S92.002G OPEN DISPLACED FRACTURE OF LEFT CALCANEUS WITH DELAYED HEALING, UNSPECIFIED PORTION OF CALCANEUS, SUBSEQUENT ENCOUNTER: ICD-10-CM

## 2024-04-29 PROCEDURE — 97112 NEUROMUSCULAR REEDUCATION: CPT | Performed by: PHYSICAL THERAPIST

## 2024-04-29 NOTE — PROGRESS NOTES
Physical Therapy Daily Treatment Note    Date: 2024  Patient Name: Devaughn Dawson  : 2005   MRN: [id number]  DOInjury: 2023  DOSx: 2023; ex fix removal 2023      Referring Provider:   Lupillo Sumner PA  [address] [place] [address]      Medical Diagnosis:   LEFT FOOT IRRIGAATION AND DEBRIDEMENT WITH EXTERNAL FIXATOR APPLICATION       Procedure(s):  LEFT FOOT IRRIGAATION AND DEBRIDEMENT WITH EXTERNAL FIXATOR APPLICATION     Surgeon(s):  Carlos A Hanley MD    Devaughn has a tight ankle and L LE weakness - both have improved, but need more work.  Treatment started today with stretching PF and DF.  Will add INV and EV next.  Will add heavy single leg strengthening ASAP.         Access Code: [email or web address]  URL: [email or web address]  Date: 2024  Prepared by: Delmar Martinez    Exercises  - Standing Ankle Dorsiflexion Stretch on Chair  - 2 x daily - 3 reps - 30 sec hold  - Standing Plantarflexion Stretch   - 2 x daily - 3 reps - 30 sec hold hold    X = TO BE PERFORMED NEXT VISIT  > = PROGRESS TO THIS    S: No new report  O:   Time 7778-8749     Visit -    30 total; 13 used prior     Repeat outcome measure at mid point and end.    Pain 0/10     ROM See eval     Modalities         MO   Manual            Stretch      Self ankle stretch leaning onto step or chair 3 x 30 sec  NR   Standing PF stretch (L foot on chair)  3 x 30 sec  NR   INV stretch on edge of board 3 x 30 sec  NR   EV stretch on edge of board 3 x 30 sec  NR   Exercise      Step down with emphasis on slow step down to load and drive dorsiflexion followed by single leg squat with emphasis of pushing knee forward during squat  1 x 10 step down    1 x 10 single leg squat (light single hand hold) Will increase sets and reps as able and appropriate NR   Single leg calf raise with emphasis on as little hand hold as possible to be successful  3 x 12 Will add weight as able NR            TE   Leg Press 1-leg

## 2024-05-06 ENCOUNTER — TREATMENT (OUTPATIENT)
Dept: PHYSICAL THERAPY | Age: 19
End: 2024-05-06
Payer: COMMERCIAL

## 2024-05-06 DIAGNOSIS — S93.315A OPEN LATERAL DISLOCATION OF LEFT SUBTALAR JOINT, INITIAL ENCOUNTER: ICD-10-CM

## 2024-05-06 DIAGNOSIS — S92.002G OPEN DISPLACED FRACTURE OF LEFT CALCANEUS WITH DELAYED HEALING, UNSPECIFIED PORTION OF CALCANEUS, SUBSEQUENT ENCOUNTER: ICD-10-CM

## 2024-05-06 DIAGNOSIS — S91.302D OPEN LATERAL DISLOCATION OF LEFT SUBTALAR JOINT, SUBSEQUENT ENCOUNTER: Primary | ICD-10-CM

## 2024-05-06 DIAGNOSIS — S91.302A OPEN LATERAL DISLOCATION OF LEFT SUBTALAR JOINT, INITIAL ENCOUNTER: ICD-10-CM

## 2024-05-06 DIAGNOSIS — S93.315D OPEN LATERAL DISLOCATION OF LEFT SUBTALAR JOINT, SUBSEQUENT ENCOUNTER: Primary | ICD-10-CM

## 2024-05-06 PROCEDURE — 97112 NEUROMUSCULAR REEDUCATION: CPT

## 2024-05-06 NOTE — PROGRESS NOTES
Physical Therapy Daily Treatment Note    Date: 2024  Patient Name: Devaughn Dawson  : 2005   MRN: [id number]  DOInjury: 2023  DOSx: 2023; ex fix removal 2023      Referring Provider:   Lupillo Sumner PA  [address] [place] [address]      Medical Diagnosis:   LEFT FOOT IRRIGAATION AND DEBRIDEMENT WITH EXTERNAL FIXATOR APPLICATION       Procedure(s):  LEFT FOOT IRRIGAATION AND DEBRIDEMENT WITH EXTERNAL FIXATOR APPLICATION     Surgeon(s):  Carlos A Hanley MD    Devaughn has a tight ankle and L LE weakness - both have improved, but need more work. Treatment started today with stretching PF and DF. Will add INV and EV next.  Will add heavy single leg strengthening ASAP.         Access Code: [email or web address]  URL: [email or web address]  Date: 2024  Prepared by: Delmar Martinez    Exercises  - Standing Ankle Dorsiflexion Stretch on Chair  - 2 x daily - 3 reps - 30 sec hold  - Standing Plantarflexion Stretch   - 2 x daily - 3 reps - 30 sec hold hold    X = TO BE PERFORMED NEXT VISIT  > = PROGRESS TO THIS    S: Patient reports feeling pretty good today.   O:   Time 5985-2867     Visit -    30 total; 13 used prior     Repeat outcome measure at mid point and end.    Pain 0/10     ROM See eval     Modalities         MO   Manual            Stretch      Self ankle stretch leaning onto step or chair 3 x 30 sec  NR   Standing PF stretch (L foot on chair)  3 x 30 sec  NR   INV stretch on edge of board 3 x 30 sec  NR   EV stretch on edge of board 3 x 30 sec  NR   Exercise      Step down with emphasis on slow step down to load and drive dorsiflexion followed by single leg squat with emphasis of pushing knee forward during squat  1 x 10 step down    1 x 10 single leg squat (light single hand hold) Will increase sets and reps as able and appropriate NR   Single leg calf raise with emphasis on as little hand hold as possible to be successful  3 x 12 Will add weight as able NR

## 2024-05-08 ENCOUNTER — TREATMENT (OUTPATIENT)
Dept: PHYSICAL THERAPY | Age: 19
End: 2024-05-08
Payer: COMMERCIAL

## 2024-05-08 DIAGNOSIS — S92.002G OPEN DISPLACED FRACTURE OF LEFT CALCANEUS WITH DELAYED HEALING, UNSPECIFIED PORTION OF CALCANEUS, SUBSEQUENT ENCOUNTER: ICD-10-CM

## 2024-05-08 DIAGNOSIS — S91.302D OPEN LATERAL DISLOCATION OF LEFT SUBTALAR JOINT, SUBSEQUENT ENCOUNTER: Primary | ICD-10-CM

## 2024-05-08 DIAGNOSIS — S93.315D OPEN LATERAL DISLOCATION OF LEFT SUBTALAR JOINT, SUBSEQUENT ENCOUNTER: Primary | ICD-10-CM

## 2024-05-08 DIAGNOSIS — S93.315A OPEN LATERAL DISLOCATION OF LEFT SUBTALAR JOINT, INITIAL ENCOUNTER: ICD-10-CM

## 2024-05-08 DIAGNOSIS — S91.302A OPEN LATERAL DISLOCATION OF LEFT SUBTALAR JOINT, INITIAL ENCOUNTER: ICD-10-CM

## 2024-05-08 PROCEDURE — 97112 NEUROMUSCULAR REEDUCATION: CPT

## 2024-05-08 NOTE — PROGRESS NOTES
Physical Therapy Daily Treatment Note    Date: 2024  Patient Name: Devaughn Dawson  : 2005   MRN: [id number]  DOInjury: 2023  DOSx: 2023; ex fix removal 2023      Referring Provider:   Lupillo Sumner PA  [address] [place] [address]      Medical Diagnosis:   LEFT FOOT IRRIGAATION AND DEBRIDEMENT WITH EXTERNAL FIXATOR APPLICATION       Procedure(s):  LEFT FOOT IRRIGAATION AND DEBRIDEMENT WITH EXTERNAL FIXATOR APPLICATION     Surgeon(s):  Carlos A Hanley MD    Devaughn has a tight ankle and L LE weakness - both have improved, but need more work. Treatment started today with stretching PF and DF. Will add INV and EV next.  Will add heavy single leg strengthening ASAP.         Access Code: [email or web address]  URL: [email or web address]  Date: 2024  Prepared by: Delmar Martinez    Exercises  - Standing Ankle Dorsiflexion Stretch on Chair  - 2 x daily - 3 reps - 30 sec hold  - Standing Plantarflexion Stretch   - 2 x daily - 3 reps - 30 sec hold hold    X = TO BE PERFORMED NEXT VISIT  > = PROGRESS TO THIS    S: Patient reports he was a little sore at work but felt better once he walked it off.  O:  Time 1516-9444     Visit 3/8-    30 total; 13 used prior     Repeat outcome measure at mid point and end.    Pain 0/10     ROM See eval     Modalities         MO   Manual            Stretch      Self ankle stretch leaning onto step or chair 3 x 30 sec  NR   Standing PF stretch (L foot on chair)  3 x 30 sec  NR   INV stretch on edge of board 3 x 30 sec  NR   EV stretch on edge of board 3 x 30 sec  NR   Exercise      Step down with emphasis on slow step down to load and drive dorsiflexion followed by single leg squat with emphasis of pushing knee forward during squat  1 x 15 step down    1 x 15 single leg squat (light single hand hold) Will increase sets and reps as able and appropriate NR   Single leg calf raise with emphasis on as little hand hold as possible to be successful

## 2024-05-14 ENCOUNTER — TREATMENT (OUTPATIENT)
Dept: PHYSICAL THERAPY | Age: 19
End: 2024-05-14
Payer: COMMERCIAL

## 2024-05-14 DIAGNOSIS — S92.002G OPEN DISPLACED FRACTURE OF LEFT CALCANEUS WITH DELAYED HEALING, UNSPECIFIED PORTION OF CALCANEUS, SUBSEQUENT ENCOUNTER: ICD-10-CM

## 2024-05-14 DIAGNOSIS — S91.302D OPEN LATERAL DISLOCATION OF LEFT SUBTALAR JOINT, SUBSEQUENT ENCOUNTER: Primary | ICD-10-CM

## 2024-05-14 DIAGNOSIS — S93.315D OPEN LATERAL DISLOCATION OF LEFT SUBTALAR JOINT, SUBSEQUENT ENCOUNTER: Primary | ICD-10-CM

## 2024-05-14 DIAGNOSIS — S91.302A OPEN LATERAL DISLOCATION OF LEFT SUBTALAR JOINT, INITIAL ENCOUNTER: ICD-10-CM

## 2024-05-14 DIAGNOSIS — S93.315A OPEN LATERAL DISLOCATION OF LEFT SUBTALAR JOINT, INITIAL ENCOUNTER: ICD-10-CM

## 2024-05-14 PROCEDURE — 97112 NEUROMUSCULAR REEDUCATION: CPT

## 2024-05-14 NOTE — PROGRESS NOTES
Physical Therapy Daily Treatment Note    Date: 2024  Patient Name: Devaughn Dawson  : 2005   MRN: [id number]  DOInjury: 2023  DOSx: 2023; ex fix removal 2023      Referring Provider:   Lupillo Sumner PA  [address] [place] [address]      Medical Diagnosis:   LEFT FOOT IRRIGAATION AND DEBRIDEMENT WITH EXTERNAL FIXATOR APPLICATION       Procedure(s):  LEFT FOOT IRRIGAATION AND DEBRIDEMENT WITH EXTERNAL FIXATOR APPLICATION     Surgeon(s):  Carlos A Hanley MD    Devaughn has a tight ankle and L LE weakness - both have improved, but need more work. Treatment started today with stretching PF and DF. Will add INV and EV next.  Will add heavy single leg strengthening ASAP.         Access Code: [email or web address]  URL: [email or web address]  Date: 2024  Prepared by: Delmar Martinez    Exercises  - Standing Ankle Dorsiflexion Stretch on Chair  - 2 x daily - 3 reps - 30 sec hold  - Standing Plantarflexion Stretch   - 2 x daily - 3 reps - 30 sec hold hold    X = TO BE PERFORMED NEXT VISIT  > = PROGRESS TO THIS    S: Patient reports he was a little sore at work but felt better once he walked it off. Pt. Stated he felt 1/10 pain when walking down stairs before treatment. Pain remained the same after treatment.  Pt. Stated that he has been performing HEP.   O:  Time 5735-8691     Visit -    30 total; 13 used prior     Repeat outcome measure at mid point and end.    Pain 1/10     ROM See eval     Modalities         MO   Manual            Stretch      Self ankle stretch leaning onto step or chair 3 x 30 sec  NR   Standing PF stretch (L foot on chair)  3 x 30 sec  NR   INV stretch on edge of board 3 x 30 sec  NR   EV stretch on edge of board 3 x 30 sec  NR   Exercise      Step down with emphasis on slow step down to load and drive dorsiflexion followed by single leg squat with emphasis of pushing knee forward during squat  2 x 15 step down    2 x 15 single leg squat (light single

## 2024-05-17 ENCOUNTER — TREATMENT (OUTPATIENT)
Dept: PHYSICAL THERAPY | Age: 19
End: 2024-05-17

## 2024-05-17 DIAGNOSIS — S92.002G OPEN DISPLACED FRACTURE OF LEFT CALCANEUS WITH DELAYED HEALING, UNSPECIFIED PORTION OF CALCANEUS, SUBSEQUENT ENCOUNTER: ICD-10-CM

## 2024-05-17 DIAGNOSIS — S93.315A OPEN LATERAL DISLOCATION OF LEFT SUBTALAR JOINT, INITIAL ENCOUNTER: ICD-10-CM

## 2024-05-17 DIAGNOSIS — S91.302A OPEN LATERAL DISLOCATION OF LEFT SUBTALAR JOINT, INITIAL ENCOUNTER: ICD-10-CM

## 2024-05-17 DIAGNOSIS — S93.315D OPEN LATERAL DISLOCATION OF LEFT SUBTALAR JOINT, SUBSEQUENT ENCOUNTER: Primary | ICD-10-CM

## 2024-05-17 DIAGNOSIS — S91.302D OPEN LATERAL DISLOCATION OF LEFT SUBTALAR JOINT, SUBSEQUENT ENCOUNTER: Primary | ICD-10-CM

## 2024-05-17 NOTE — PROGRESS NOTES
Physical Therapy Daily Treatment Note    Date: 2024  Patient Name: Devaughn Dawson  : 2005   MRN: [id number]  DOInjury: 2023  DOSx: 2023; ex fix removal 2023      Referring Provider:   Lupillo Sumner PA  [address] [place] [address]      Medical Diagnosis:   LEFT FOOT IRRIGAATION AND DEBRIDEMENT WITH EXTERNAL FIXATOR APPLICATION       Procedure(s):  LEFT FOOT IRRIGAATION AND DEBRIDEMENT WITH EXTERNAL FIXATOR APPLICATION     Surgeon(s):  CarlosA Hanley MD    Devaughn has a tight ankle and L LE weakness - both have improved, but need more work. Treatment started today with stretching PF and DF. Will add INV and EV next.  Will add heavy single leg strengthening ASAP.         Access Code: [email or web address]  URL: [email or web address]  Date: 2024  Prepared by: Delmar Martinez    Exercises  - Standing Ankle Dorsiflexion Stretch on Chair  - 2 x daily - 3 reps - 30 sec hold  - Standing Plantarflexion Stretch   - 2 x daily - 3 reps - 30 sec hold hold    X = TO BE PERFORMED NEXT VISIT  > = PROGRESS TO THIS    S:  Pt. Stated he felt 1/10 pain before treatment and rehab usually makes his ankle feel better.  Pt. Stated that he has been performing HEP.   O:  Time 1532-2045     Visit -    30 total; 13 used prior     Repeat outcome measure at mid point and end.    Pain 1/10     ROM See eval     Modalities         MO   Manual            Stretch      Self ankle stretch leaning onto step or chair 3 x 30 sec  NR   Standing PF stretch (L foot on chair)  3 x 30 sec  NR   INV stretch on edge of board 3 x 30 sec  NR   EV stretch on edge of board 3 x 30 sec  NR   Exercise      Step down with emphasis on slow step down to load and drive dorsiflexion followed by single leg squat with emphasis of pushing knee forward during squat  2 x 15 step down    2 x 15 single leg squat (light single hand hold) Will increase sets and reps as able and appropriate NR   Single leg calf raise with

## 2024-05-21 ENCOUNTER — TREATMENT (OUTPATIENT)
Dept: PHYSICAL THERAPY | Age: 19
End: 2024-05-21
Payer: COMMERCIAL

## 2024-05-21 DIAGNOSIS — S93.315D OPEN LATERAL DISLOCATION OF LEFT SUBTALAR JOINT, SUBSEQUENT ENCOUNTER: Primary | ICD-10-CM

## 2024-05-21 DIAGNOSIS — S91.302D OPEN LATERAL DISLOCATION OF LEFT SUBTALAR JOINT, SUBSEQUENT ENCOUNTER: Primary | ICD-10-CM

## 2024-05-21 DIAGNOSIS — S91.302A OPEN LATERAL DISLOCATION OF LEFT SUBTALAR JOINT, INITIAL ENCOUNTER: ICD-10-CM

## 2024-05-21 DIAGNOSIS — S93.315A OPEN LATERAL DISLOCATION OF LEFT SUBTALAR JOINT, INITIAL ENCOUNTER: ICD-10-CM

## 2024-05-21 DIAGNOSIS — S92.002G OPEN DISPLACED FRACTURE OF LEFT CALCANEUS WITH DELAYED HEALING, UNSPECIFIED PORTION OF CALCANEUS, SUBSEQUENT ENCOUNTER: ICD-10-CM

## 2024-05-21 PROCEDURE — 97112 NEUROMUSCULAR REEDUCATION: CPT

## 2024-05-21 NOTE — PROGRESS NOTES
Physical Therapy Daily Treatment Note    Date: 2024  Patient Name: Devaughn Dawson  : 2005   MRN: [id number]  DOInjury: 2023  DOSx: 2023; ex fix removal 2023      Referring Provider:   Lupillo Sumner PA  [address] [place] [address]      Medical Diagnosis:   LEFT FOOT IRRIGAATION AND DEBRIDEMENT WITH EXTERNAL FIXATOR APPLICATION       Procedure(s):  LEFT FOOT IRRIGAATION AND DEBRIDEMENT WITH EXTERNAL FIXATOR APPLICATION     Surgeon(s):  Carlos A Hanley MD    Devaughn has a tight ankle and L LE weakness - both have improved, but need more work. Treatment started today with stretching PF and DF. Will add INV and EV next.  Will add heavy single leg strengthening ASAP.         Access Code: [email or web address]  URL: [email or web address]  Date: 2024  Prepared by: Delmar Martinez    Exercises  - Standing Ankle Dorsiflexion Stretch on Chair  - 2 x daily - 3 reps - 30 sec hold  - Standing Plantarflexion Stretch   - 2 x daily - 3 reps - 30 sec hold hold    X = TO BE PERFORMED NEXT VISIT  > = PROGRESS TO THIS    S:  Pt. Stated he felt sharp, stabbing pain yesterday that was 6/10 after standing for 6 hours. The pain was relieved by medicine and was reduced to 0/10 before treatment.   Pt. Stated that he has been performing HEP. Pt. States that he feels like he has improved 66%.   O: single leg calf raises were not performed today due to increased pain.   Time 7740-4090      Visit -    30 total; 13 used prior     Repeat outcome measure at mid point and end.    Pain 1/10     ROM See eval     Modalities         MO   Manual            Stretch      Self ankle stretch leaning onto step or chair 3 x 30 sec  NR   Standing PF stretch (L foot on chair)  3 x 30 sec  NR   INV stretch on edge of board 3 x 30 sec  NR   EV stretch on edge of board 3 x 30 sec  NR   Exercise      Step down with emphasis on slow step down to load and drive dorsiflexion followed by single leg squat with

## 2024-05-24 ENCOUNTER — TREATMENT (OUTPATIENT)
Dept: PHYSICAL THERAPY | Age: 19
End: 2024-05-24

## 2024-05-24 DIAGNOSIS — S93.315D OPEN LATERAL DISLOCATION OF LEFT SUBTALAR JOINT, SUBSEQUENT ENCOUNTER: Primary | ICD-10-CM

## 2024-05-24 DIAGNOSIS — S91.302D OPEN LATERAL DISLOCATION OF LEFT SUBTALAR JOINT, SUBSEQUENT ENCOUNTER: Primary | ICD-10-CM

## 2024-05-24 NOTE — PROGRESS NOTES
Physical Therapy Daily Treatment Note    Date: 2024  Patient Name: Devaughn Dawson  : 2005   MRN: [id number]  DOInjury: 2023  DOSx: 2023; ex fix removal 2023      Referring Provider:   Lupillo Sumner PA  [address] [place] [address]      Medical Diagnosis:   LEFT FOOT IRRIGAATION AND DEBRIDEMENT WITH EXTERNAL FIXATOR APPLICATION       Procedure(s):  LEFT FOOT IRRIGAATION AND DEBRIDEMENT WITH EXTERNAL FIXATOR APPLICATION     Surgeon(s):  Carlos A Hanley MD    Devaughn has a tight ankle and L LE weakness - both have improved, but need more work. Treatment started today with stretching PF and DF. Will add INV and EV next.  Will add heavy single leg strengthening ASAP.         Access Code: [email or web address]  URL: [email or web address]  Date: 2024  Prepared by: Delmar Martinez    Exercises  - Standing Ankle Dorsiflexion Stretch on Chair  - 2 x daily - 3 reps - 30 sec hold  - Standing Plantarflexion Stretch   - 2 x daily - 3 reps - 30 sec hold hold    X = TO BE PERFORMED NEXT VISIT  > = PROGRESS TO THIS    S: pt. States that the sharp pain mentioned last treatment has subsided.  Pt. Stated that he has been performing HEP.  he feels like he has improved 66%.   O: single leg calf raises were not performed today due to increased pain.   Time 1152-6503     Visit -    30 total; 13 used prior     Repeat outcome measure at mid point and end.    Pain 1/10     ROM See eval     Modalities         MO   Manual            Stretch      Self ankle stretch leaning onto step or chair 3 x 30 sec  NR   Standing PF stretch (L foot on chair)  3 x 30 sec  NR   INV stretch on edge of board 3 x 30 sec  NR   EV stretch on edge of board 3 x 30 sec  NR   Exercise      Step down with emphasis on slow step down to load and drive dorsiflexion followed by single leg squat with emphasis of pushing knee forward during squat  2 x 15 step down with heel tap    2 x 15 single leg squat (light single hand

## 2024-05-29 ENCOUNTER — TREATMENT (OUTPATIENT)
Dept: PHYSICAL THERAPY | Age: 19
End: 2024-05-29
Payer: COMMERCIAL

## 2024-05-29 DIAGNOSIS — S93.315D OPEN LATERAL DISLOCATION OF LEFT SUBTALAR JOINT, SUBSEQUENT ENCOUNTER: Primary | ICD-10-CM

## 2024-05-29 DIAGNOSIS — S91.302D OPEN LATERAL DISLOCATION OF LEFT SUBTALAR JOINT, SUBSEQUENT ENCOUNTER: Primary | ICD-10-CM

## 2024-05-29 PROCEDURE — 97530 THERAPEUTIC ACTIVITIES: CPT

## 2024-05-29 PROCEDURE — 97110 THERAPEUTIC EXERCISES: CPT

## 2024-05-29 PROCEDURE — 97112 NEUROMUSCULAR REEDUCATION: CPT

## 2024-05-29 NOTE — PROGRESS NOTES
Physical Therapy Daily Treatment Note    Date: 2024  Patient Name: Devaughn Dawson  : 2005   MRN: [id number]  DOInjury: 2023  DOSx: 2023; ex fix removal 2023      Referring Provider:   Lupillo Sumner PA  [address] [place] [address]      Medical Diagnosis:   LEFT FOOT IRRIGAATION AND DEBRIDEMENT WITH EXTERNAL FIXATOR APPLICATION       Procedure(s):  LEFT FOOT IRRIGAATION AND DEBRIDEMENT WITH EXTERNAL FIXATOR APPLICATION     Surgeon(s):  Carlos A Hanley MD    Devaughn has a tight ankle and L LE weakness - both have improved, but need more work. Treatment started today with stretching PF and DF. Will add INV and EV next.  Will add heavy single leg strengthening ASAP.         Access Code: [email or web address]  URL: [email or web address]  Date: 2024  Prepared by: Delmar Martinez    Exercises  - Standing Ankle Dorsiflexion Stretch on Chair  - 2 x daily - 3 reps - 30 sec hold  - Standing Plantarflexion Stretch   - 2 x daily - 3 reps - 30 sec hold hold    X = TO BE PERFORMED NEXT VISIT  > = PROGRESS TO THIS    S: pt. States that the sharp pain mentioned last treatment has subsided.  Pt. Stated that he has been performing HEP.  he feels like he has improved 66%.   O: single leg calf raises were not performed today due to increased pain.   Time 3105-7022     Visit -12    30 total; 13 used prior     Repeat outcome measure at mid point and end.    Pain 1/10     ROM See eval     Modalities         MO   Manual            Stretch      Self ankle stretch leaning onto step or chair 3 x 30 sec  NR   Standing PF stretch (L foot on chair)  3 x 30 sec  NR   INV stretch on edge of board 3 x 30 sec  NR   EV stretch on edge of board 3 x 30 sec  NR   Exercise      Step down with emphasis on slow step down to load and drive dorsiflexion followed by single leg squat with emphasis of pushing knee forward during squat  20x  step down with heel tap    20x  single leg squat (light single hand

## 2024-06-03 ENCOUNTER — TREATMENT (OUTPATIENT)
Dept: PHYSICAL THERAPY | Age: 19
End: 2024-06-03
Payer: COMMERCIAL

## 2024-06-03 DIAGNOSIS — S92.002G OPEN DISPLACED FRACTURE OF LEFT CALCANEUS WITH DELAYED HEALING, UNSPECIFIED PORTION OF CALCANEUS, SUBSEQUENT ENCOUNTER: ICD-10-CM

## 2024-06-03 DIAGNOSIS — S91.302A OPEN LATERAL DISLOCATION OF LEFT SUBTALAR JOINT, INITIAL ENCOUNTER: ICD-10-CM

## 2024-06-03 DIAGNOSIS — S93.315A OPEN LATERAL DISLOCATION OF LEFT SUBTALAR JOINT, INITIAL ENCOUNTER: ICD-10-CM

## 2024-06-03 DIAGNOSIS — S93.315D OPEN LATERAL DISLOCATION OF LEFT SUBTALAR JOINT, SUBSEQUENT ENCOUNTER: Primary | ICD-10-CM

## 2024-06-03 DIAGNOSIS — S91.302D OPEN LATERAL DISLOCATION OF LEFT SUBTALAR JOINT, SUBSEQUENT ENCOUNTER: Primary | ICD-10-CM

## 2024-06-03 PROCEDURE — 97530 THERAPEUTIC ACTIVITIES: CPT

## 2024-06-03 PROCEDURE — 97110 THERAPEUTIC EXERCISES: CPT

## 2024-06-03 NOTE — PROGRESS NOTES
Physical Therapy Daily Treatment Note    Date: 6/3/2024  Patient Name: Devaughn Dawson  : 2005   MRN: [id number]  DOInjury: 2023  DOSx: 2023; ex fix removal 2023      Referring Provider:   Lupillo Sumner PA  [address] [place] [address]      Medical Diagnosis:   LEFT FOOT IRRIGAATION AND DEBRIDEMENT WITH EXTERNAL FIXATOR APPLICATION       Procedure(s):  LEFT FOOT IRRIGAATION AND DEBRIDEMENT WITH EXTERNAL FIXATOR APPLICATION     Surgeon(s):  Carlos A Hanley MD    Devaughn has a tight ankle and L LE weakness - both have improved, but need more work. Treatment started today with stretching PF and DF. Will add INV and EV next.  Will add heavy single leg strengthening ASAP.         Access Code: [email or web address]  URL: [email or web address]  Date: 2024  Prepared by: Delmar Martinez    Exercises  - Standing Ankle Dorsiflexion Stretch on Chair  - 2 x daily - 3 reps - 30 sec hold  - Standing Plantarflexion Stretch   - 2 x daily - 3 reps - 30 sec hold hold    X = TO BE PERFORMED NEXT VISIT  > = PROGRESS TO THIS    S: pt. States that the sharp pain mentioned last treatment has subsided.  Pt. Stated that he has been performing HEP.  he feels like he has improved 66%.   O: single leg calf raises were not performed today due to increased pain.   Time 7903-6190     Visit -    30 total; 13 used prior     Repeat outcome measure at mid point and end.    Pain 1/10     ROM See eval     Modalities         MO   Manual            Stretch     Self ankle stretch leaning onto step or chair  NR   Standing PF stretch (L foot on chair)   NR   INV stretch on edge of board  NR   EV stretch on edge of board  NR   Exercise      Step down with emphasis on slow step down to load and drive dorsiflexion followed by single leg squat with emphasis of pushing knee forward during squat  Will increase sets and reps as able and appropriate NR   Single leg calf raise with emphasis on as little hand hold as

## 2024-06-05 ENCOUNTER — TREATMENT (OUTPATIENT)
Dept: PHYSICAL THERAPY | Age: 19
End: 2024-06-05
Payer: COMMERCIAL

## 2024-06-05 DIAGNOSIS — S91.302D OPEN LATERAL DISLOCATION OF LEFT SUBTALAR JOINT, SUBSEQUENT ENCOUNTER: Primary | ICD-10-CM

## 2024-06-05 DIAGNOSIS — S93.315D OPEN LATERAL DISLOCATION OF LEFT SUBTALAR JOINT, SUBSEQUENT ENCOUNTER: Primary | ICD-10-CM

## 2024-06-05 DIAGNOSIS — S92.002G OPEN DISPLACED FRACTURE OF LEFT CALCANEUS WITH DELAYED HEALING, UNSPECIFIED PORTION OF CALCANEUS, SUBSEQUENT ENCOUNTER: ICD-10-CM

## 2024-06-05 PROCEDURE — 97110 THERAPEUTIC EXERCISES: CPT

## 2024-06-05 NOTE — PROGRESS NOTES
Physical Therapy Daily Treatment Note    Date: 2024  Patient Name: Devaughn Dawson  : 2005   MRN: [id number]  DOInjury: 2023  DOSx: 2023; ex fix removal 2023      Referring Provider:   Lupillo Sumner PA  [address] [place] [address]      Medical Diagnosis:   LEFT FOOT IRRIGAATION AND DEBRIDEMENT WITH EXTERNAL FIXATOR APPLICATION       Procedure(s):  LEFT FOOT IRRIGAATION AND DEBRIDEMENT WITH EXTERNAL FIXATOR APPLICATION     Surgeon(s):  Carlos A Hanley MD    Devaughn has a tight ankle and L LE weakness - both have improved, but need more work. Treatment started today with stretching PF and DF. Will add INV and EV next.  Will add heavy single leg strengthening ASAP.         Access Code: [email or web address]  URL: [email or web address]  Date: 2024  Prepared by: Delmar Martinez    Exercises  - Standing Ankle Dorsiflexion Stretch on Chair  - 2 x daily - 3 reps - 30 sec hold  - Standing Plantarflexion Stretch   - 2 x daily - 3 reps - 30 sec hold hold    X = TO BE PERFORMED NEXT VISIT  > = PROGRESS TO THIS    S: pt. States that the sharp pain mentioned in previous treatments has subsided and presents to treatment with 0/10 pain.  Pt. Stated that he has been performing HEP.  he feels like he has improved 66%.   O: Time 3242-5744     Visit -12    30 total; 13 used prior     Repeat outcome measure at mid point and end.    Pain 0/10     ROM See eval     Modalities         MO   Manual            Stretch     Self ankle stretch leaning onto step or chair  NR   Standing PF stretch (L foot on chair)   NR   INV stretch on edge of board  NR   EV stretch on edge of board  NR   Exercise      Step down with emphasis on slow step down to load and drive dorsiflexion followed by single leg squat with emphasis of pushing knee forward during squat  Will increase sets and reps as able and appropriate NR   Single leg calf raise with emphasis on as little hand hold as possible to be successful

## 2024-06-10 ENCOUNTER — TREATMENT (OUTPATIENT)
Dept: PHYSICAL THERAPY | Age: 19
End: 2024-06-10
Payer: COMMERCIAL

## 2024-06-10 DIAGNOSIS — S91.302D OPEN LATERAL DISLOCATION OF LEFT SUBTALAR JOINT, SUBSEQUENT ENCOUNTER: Primary | ICD-10-CM

## 2024-06-10 DIAGNOSIS — S93.315D OPEN LATERAL DISLOCATION OF LEFT SUBTALAR JOINT, SUBSEQUENT ENCOUNTER: Primary | ICD-10-CM

## 2024-06-10 PROCEDURE — 97110 THERAPEUTIC EXERCISES: CPT

## 2024-06-10 NOTE — PROGRESS NOTES
Physical Therapy Daily Treatment Note    Date: 6/10/2024  Patient Name: Devaughn Dawson  : 2005   MRN: [id number]  DOInjury: 2023  DOSx: 2023; ex fix removal 2023      Referring Provider:   Lupillo Sumner PA  [address] [place] [address]      Medical Diagnosis:   LEFT FOOT IRRIGAATION AND DEBRIDEMENT WITH EXTERNAL FIXATOR APPLICATION       Procedure(s):  LEFT FOOT IRRIGAATION AND DEBRIDEMENT WITH EXTERNAL FIXATOR APPLICATION     Surgeon(s):  Carlos A Hanley MD    Devaughn has a tight ankle and L LE weakness - both have improved, but need more work. Treatment started today with stretching PF and DF. Will add INV and EV next.  Will add heavy single leg strengthening ASAP.         Access Code: [email or web address]  URL: [email or web address]  Date: 2024  Prepared by: Delmar Martinez    Exercises  - Standing Ankle Dorsiflexion Stretch on Chair  - 2 x daily - 3 reps - 30 sec hold  - Standing Plantarflexion Stretch   - 2 x daily - 3 reps - 30 sec hold hold    X = TO BE PERFORMED NEXT VISIT  > = PROGRESS TO THIS    S: Patient misplaced step at work where he was hurting earlier from that.   O: Time 2172-6089     Visit -       Repeat outcome measure at mid point and end.    Pain 0/10     ROM See eval     Modalities         MO   Manual            Stretch     Self ankle stretch leaning onto step or chair  NR   Standing PF stretch (L foot on chair)   NR   INV stretch on edge of board  NR   EV stretch on edge of board  NR   Exercise      Step down with emphasis on slow step down to load and drive dorsiflexion followed by single leg squat with emphasis of pushing knee forward during squat  Will increase sets and reps as able and appropriate NR   Single leg calf raise with emphasis on as little hand hold as possible to be successful  15# left hand  3 x 15   TE   Calf raises with gastrocnemius stretch on stair.      Slant board stretching  Dorsiflexion highest level 3 x 30 seconds and

## 2024-06-12 ENCOUNTER — TREATMENT (OUTPATIENT)
Dept: PHYSICAL THERAPY | Age: 19
End: 2024-06-12
Payer: COMMERCIAL

## 2024-06-12 DIAGNOSIS — S93.315D OPEN LATERAL DISLOCATION OF LEFT SUBTALAR JOINT, SUBSEQUENT ENCOUNTER: Primary | ICD-10-CM

## 2024-06-12 DIAGNOSIS — S91.302D OPEN LATERAL DISLOCATION OF LEFT SUBTALAR JOINT, SUBSEQUENT ENCOUNTER: Primary | ICD-10-CM

## 2024-06-12 PROCEDURE — 97110 THERAPEUTIC EXERCISES: CPT

## 2024-06-12 NOTE — PROGRESS NOTES
Physical Therapy Daily Treatment Note    Date: 2024  Patient Name: Devaughn Dawson  : 2005   MRN: [id number]  DOInjury: 2023  DOSx: 2023; ex fix removal 2023      Referring Provider:   Lupillo Sumner PA  [address] [place] [address]      Medical Diagnosis:   LEFT FOOT IRRIGAATION AND DEBRIDEMENT WITH EXTERNAL FIXATOR APPLICATION       Procedure(s):  LEFT FOOT IRRIGAATION AND DEBRIDEMENT WITH EXTERNAL FIXATOR APPLICATION     Surgeon(s):  Carlos A Hanley MD    Devaughn has a tight ankle and L LE weakness - both have improved, but need more work. Treatment started today with stretching PF and DF. Will add INV and EV next.  Will add heavy single leg strengthening ASAP.         Access Code: [email or web address]  URL: [email or web address]  Date: 2024  Prepared by: Delmar Martinez    Exercises  - Standing Ankle Dorsiflexion Stretch on Chair  - 2 x daily - 3 reps - 30 sec hold  - Standing Plantarflexion Stretch - 2 x daily - 3 reps - 30 sec hold hold    X = TO BE PERFORMED NEXT VISIT  > = PROGRESS TO THIS    S: Patient misplaced step at work where he was hurting earlier from that.   O: Time 1163-0962     Visit        Repeat outcome measure at mid point and end.    Pain 0/10     ROM See eval     Modalities         MO   Manual            Stretch     Self ankle stretch leaning onto step or chair  NR   Standing PF stretch (L foot on chair)   NR   INV stretch on edge of board  NR   EV stretch on edge of board  NR   Exercise      Step down with emphasis on slow step down to load and drive dorsiflexion followed by single leg squat with emphasis of pushing knee forward during squat  Will increase sets and reps as able and appropriate NR   Single leg calf raise with emphasis on as little hand hold as possible to be successful  15# left hand  3 x 15   TE   Calf raises with gastrocnemius stretch on stair.      Slant board stretching  Dorsiflexion highest level 3 x 30 seconds and

## (undated) DEVICE — BNDG,ELSTC,MATRIX,STRL,3"X5YD,LF,HOOK&LP: Brand: MEDLINE

## (undated) DEVICE — PAD,ABDOMINAL,5"X9",ST,LF,25/BX: Brand: MEDLINE INDUSTRIES, INC.

## (undated) DEVICE — 4-PORT MANIFOLD: Brand: NEPTUNE 2

## (undated) DEVICE — BANDAGE COMPR W4INXL10YD WHITE/BEIGE E MTRX HK LOOP CLSR

## (undated) DEVICE — DRESSING,GAUZE,XEROFORM,CURAD,5"X9",ST: Brand: CURAD

## (undated) DEVICE — PADDING,UNDERCAST,COTTON, 4"X4YD STERILE: Brand: MEDLINE

## (undated) DEVICE — SCREW EXT FIX L80MM DIA4MM THRD DIA3MM S STL SELF DRL BLNT

## (undated) DEVICE — CLAMP EXT FIX L TI ALLY COMB CLP ON SELF HLD

## (undated) DEVICE — ROD EXT FIX L150MM DIA11MM C FBR MR CONDITIONAL

## (undated) DEVICE — SOLUTION IRRIG 1000ML 0.9% SOD CHL USP POUR PLAS BTL

## (undated) DEVICE — HYPODERMIC SAFETY NEEDLE: Brand: MAGELLAN

## (undated) DEVICE — Device

## (undated) DEVICE — SOLUTION IRRIG 1000ML STRL H2O USP PLAS POUR BTL

## (undated) DEVICE — ELECTRODE PT RET AD L9FT HI MOIST COND ADH HYDRGEL CORDED

## (undated) DEVICE — HANDPIECE SET WITH BONE CLEANING TIP AND SUCTION TUBE: Brand: INTERPULSE

## (undated) DEVICE — SCREW EXT FIX L175MM DIA5MM THRD L60MM S STL SELF DRL SCHNZ

## (undated) DEVICE — GLOVE ORANGE PI 8   MSG9080

## (undated) DEVICE — PADDING CAST W6INXL4YD ST COT COHESIVE HND TEARABLE SPEC

## (undated) DEVICE — CLAMP EXT FIX L PIN 6 POS MAG RESONANCE CONDITIONAL

## (undated) DEVICE — CLAMP EXT FIX DIA8/11MM COMB CLP ON SELF HLD

## (undated) DEVICE — DRAPE EQUIP CARM 72X42 IN RUBBER BND CLP

## (undated) DEVICE — POST EXT FIX DIA11MM 30DEG OUTRIG MAG RESONANCE CONDITIONAL

## (undated) DEVICE — SINGLE USE DEVICE INTENDED TO COVER EXPOSED ENDS OF ORTHOPEDIC PIN AND K-WIRES TO HELP PROTECT THE EXPOSED WIRE FROM SNAGGING ON CLOTHING.: Brand: OXBORO™ PIN COVER

## (undated) DEVICE — PIN FIX L225MM DIA6MM S STL FOR L EXT FIX SET